# Patient Record
Sex: FEMALE | Race: WHITE
[De-identification: names, ages, dates, MRNs, and addresses within clinical notes are randomized per-mention and may not be internally consistent; named-entity substitution may affect disease eponyms.]

---

## 2018-11-13 ENCOUNTER — HOSPITAL ENCOUNTER (EMERGENCY)
Dept: HOSPITAL 59 - ER | Age: 23
Discharge: HOME | End: 2018-11-13
Payer: SELF-PAY

## 2018-11-13 DIAGNOSIS — L02.213: Primary | ICD-10-CM

## 2018-11-13 PROCEDURE — 99284 EMERGENCY DEPT VISIT MOD MDM: CPT

## 2018-11-13 PROCEDURE — 10060 I&D ABSCESS SIMPLE/SINGLE: CPT

## 2018-11-13 NOTE — EMERGENCY DEPARTMENT RECORD
History of Present Illness





- General


Chief complaint: Abscess


Stated complaint: LUMP ON LEFT BREAST


Time Seen by Provider: 18 04:44


Source: Patient


Mode of Arrival: Ambulatory


Limitations: No limitations





- History of Present Illness


Initial comments: 





24 yo female presents to ED for evaluation of swelling, pain, and erythema to 

the left lateral breast area.  Patient reports that her pain symptoms began 2 

weeks ago, worsened this morning.  Patient denies injury to the area, fevers, 

chills, or recent illness.  Patient denies health problems at her baseline.


MD complaint: Abscess/boil


Onset/Timin


-: Week(s)


Hx Tetanus Toxoid Vaccination: Yes


Patient Tetanus UTD (within 5 yrs): Yes


Location: Chest


Severity: Moderate


Severity scale (1-10): 9


Quality: Sharp, Stabbing


Consistency: Constant


Improves with: None


Worsens with: None


Context: None


Associated symptoms: Nausea


Treatments Prior to Arrival: None





- Related Data


 Previous Rx's











 Medication  Instructions  Recorded


 


Clindamycin HCl 300 mg PO Q6H #27 capsule 18











 Allergies











Allergy/AdvReac Type Severity Reaction Status Date / Time


 


No Known Drug Allergies Allergy   Verified 18 04:35














Travel Screening





- Travel/Exposure Within Last 30 Days


Have you traveled within the last 30 days?: No





- Travel Symptoms


Symptom Screening: None





Review of Systems


Constitutional: Denies: Chills, Fever, Malaise, Night sweats


Eyes: Denies: Eye discharge, Eye pain


ENT: Denies: Congestion, Ear pain, Epistaxis


Respiratory: Denies: Cough, Dyspnea


Cardiovascular: Denies: Chest pain, Dyspnea on exertion


Endocrine: Denies: Fatigue, Heat or cold intolerance


Gastrointestinal: Denies: Abdominal pain, Nausea, Vomiting


Genitourinary: Denies: Incontinence, Retention


Musculoskeletal: Denies: Arthralgia, Back pain


Skin: Denies: Bruising, Change in color


Neurological: Denies: Abnormal gait, Confusion, Headache, Seizure


Psychiatric: Denies: Anxiety


Hematological/Lymphatic: Denies: Anemia, Blood Clots





Past Medical History





- SOCIAL HISTORY


Smoking Status: Never smoker


Alcohol Use: None


Drug Use: Heavy


Drug Use Detail:: Marijuana





- RESPIRATORY


Hx Respiratory Disorders: No





- CARDIOVASCULAR


Hx Cardio Disorders: No





- NEURO


Hx Neuro Disorders: No





- GI


Hx GI Disorders: No





- 


Hx Genitourinary Disorders: No





- ENDOCRINE


Hx Endocrine Disorders: No





- MUSCULOSKELETAL


Hx Musculoskeletal Disorders: No





- PSYCH


Hx Psych Problems: No





- HEMATOLOGY/ONCOLOGY


Hx Hematology/Oncology Disorders: No





Family Medical History


Any Significant Family History?: No


Family Hx Comment (NOT TO BE USED IN PLACE OF ITEMS BELOW): denies





Physical Exam





- General


General Appearance: Alert, Oriented x3, Cooperative, Moderate distress


Limitations: No limitations





- Head


Head exam: Atraumatic, Normocephalic, Normal inspection


Head exam detail: negative: Abrasion, Contusion, Le's sign, General 

tenderness, Hematoma, Laceration





- Eye


Eye exam: Normal appearance.  negative: Conjunctival injection, Periorbital 

swelling, Periorbital tenderness, Scleral icterus





- ENT


Ear exam: negative: Auricular hematoma, Auricular trauma


Nasal Exam: negative: Active bleeding, Discharge, Dried blood, Foreign body


Mouth exam: negative: Drooling, Laceration, Muffled voice, Tongue elevation


Throat exam: negative: Tonsillar erythema, Tonsillomegaly, R peritonsillar mass

, L peritonsillar mass





- Neck


Neck exam: Normal inspection.  negative: Meningismus, Tenderness





- Respiratory


Respiratory exam: Normal lung sounds bilaterally.  negative: Rales, Respiratory 

distress, Rhonchi, Stridor





- Cardiovascular


Cardiovascular Exam: Regular rate, Normal rhythm, Normal heart sounds





- GI/Abdominal


GI/Abdominal exam: Soft.  negative: Rebound, Rigid, Tenderness





- Rectal


Rectal exam: Deferred





- 


 exam: Deferred





- Extremities


Extremities exam: Normal inspection.  negative: Calf tenderness, Pedal edema, 

Tenderness





- Back


Back exam: Denies: CVA tenderness (R), CVA tenderness (L)





- Neurological


Neurological exam: Alert, Normal gait, Oriented X3





- Psychiatric


Psychiatric exam: Normal affect, Normal mood





- Skin


Skin exam: Erythema


Type of lesion: Abscess


Distribution of rash: Chest


Description of rash: Erythematous, Fluctuant, Indurated





Course





 Vital Signs











  18





  04:34


 


Temperature 98.4 F


 


Pulse Rate [ 116 H





Pulse Ox Probe] 


 


Respiratory 20





Rate 


 


Blood Pressure 159/105





[Left Arm] 


 


Pulse Ox 100














- Reevaluation(s)


Reevaluation #1: 





18 05:00


3.5 cm cutaneous abscess overlying the left lateral trunk lateral to the left 

breast region was prepped and draped in sterile fashion.  The lesion was then 

anesthetized with 1.5 mL of 1% Lidocaine with epinephrine with good anesthesia.

  The lesion was then incised with a #11 blade with a large amount of purulent 

drainage removed.  Lesion was then probed with curved hemostats to break up 

loculations.  Patient tolerated the procedure well without complications.


Patient was started on Clindamycin while in the ED and instructed to apply warm 

soaks twice daily.


Patient appears stable for discharge at this time.





Disposition


Disposition: Discharge


Clinical Impression: 


Cutaneous abscess


Qualifiers:


 Site of cutaneous abscess: trunk Site of cutaneous abscess of trunk: chest 

wall Qualified Code(s): L02.213 - Cutaneous abscess of chest wall





Disposition: Home, Self-Care


Condition: (2) Stable


Instructions:  Abscess Incision and Drainage (ED)


Additional Instructions: 


Return to ED if your symptoms worsen or if you have any concerns.


Clindamycin as directed.


Follow-up with your family doctor in 3-5 days as directed.


Prescriptions: 


Clindamycin HCl 300 mg PO Q6H #27 capsule


Forms:  Patient Portal Access


Time of Disposition: 04:48





Quality





- Quality Measures


Quality Measures: N/A





- Blood Pressure Screening


Does Patient Have Any of the Following: No


Blood Pressure Classification: Hypertensive Reading


Systolic Measurement: 159


Diastolic Measurement: 105


Screening for High Blood Pressure: < First Hypertensive BP, F/U Documented > [

]


First Hypertensive Follow-up Interventions: Referral to alternative/primary 

care provider.